# Patient Record
Sex: FEMALE | Race: WHITE | NOT HISPANIC OR LATINO | URBAN - METROPOLITAN AREA
[De-identification: names, ages, dates, MRNs, and addresses within clinical notes are randomized per-mention and may not be internally consistent; named-entity substitution may affect disease eponyms.]

---

## 2025-04-18 ENCOUNTER — APPOINTMENT (OUTPATIENT)
Dept: PRIMARY CARE | Facility: CLINIC | Age: 30
End: 2025-04-18
Payer: COMMERCIAL

## 2025-04-28 ENCOUNTER — APPOINTMENT (OUTPATIENT)
Dept: PRIMARY CARE | Facility: CLINIC | Age: 30
End: 2025-04-28
Payer: COMMERCIAL

## 2025-04-28 VITALS
BODY MASS INDEX: 25.33 KG/M2 | HEART RATE: 65 BPM | WEIGHT: 152 LBS | OXYGEN SATURATION: 99 % | HEIGHT: 65 IN | DIASTOLIC BLOOD PRESSURE: 71 MMHG | SYSTOLIC BLOOD PRESSURE: 127 MMHG

## 2025-04-28 DIAGNOSIS — R68.89 COLD FEELING: ICD-10-CM

## 2025-04-28 DIAGNOSIS — Z13.220 SCREENING FOR HYPERLIPIDEMIA: ICD-10-CM

## 2025-04-28 DIAGNOSIS — Z00.00 WELLNESS EXAMINATION: Primary | ICD-10-CM

## 2025-04-28 DIAGNOSIS — Z13.21 ENCOUNTER FOR VITAMIN DEFICIENCY SCREENING: ICD-10-CM

## 2025-04-28 DIAGNOSIS — Z13.1 SCREENING FOR DIABETES MELLITUS: ICD-10-CM

## 2025-04-28 PROCEDURE — 1036F TOBACCO NON-USER: CPT

## 2025-04-28 PROCEDURE — 3008F BODY MASS INDEX DOCD: CPT

## 2025-04-28 PROCEDURE — 90715 TDAP VACCINE 7 YRS/> IM: CPT

## 2025-04-28 PROCEDURE — 99385 PREV VISIT NEW AGE 18-39: CPT

## 2025-04-28 PROCEDURE — 90471 IMMUNIZATION ADMIN: CPT

## 2025-04-28 ASSESSMENT — PATIENT HEALTH QUESTIONNAIRE - PHQ9
1. LITTLE INTEREST OR PLEASURE IN DOING THINGS: NOT AT ALL
2. FEELING DOWN, DEPRESSED OR HOPELESS: NOT AT ALL
SUM OF ALL RESPONSES TO PHQ9 QUESTIONS 1 AND 2: 0

## 2025-04-28 NOTE — PROGRESS NOTES
"  Subjective   Patient ID: Parvin Alaniz is a 29 y.o. female who presents for Annual Exam (Pt states she is here to establish care and for annual exam. Pt states she does see an OBGYN. Last pap 2023. No other concerns at the moment.).    Patient presents to establish care and have yearly physical.  Patient states that she has not had a physical in probably at least 10 years.  Patient denies any significant past medical history.  Patient states that she does have occasional constipation, and does take Metamucil which usually resolves her symptoms.  She also endorses being cold generally only at night.  Patient states that this is not an every night thing, but was concerned she could possibly be anemic.        Past Medical, Surgical, Social and Family Hx reviewed-Yes        Last pap: 2023  Last Mammogram: N/A  Colon CA screening Hx: N/A  Labs: CMP, Vit D, Lipid, A1C, CBC, Iron/Ferritin  Up to date on immunizations: Tdap given today  Dentist in the past year: Yes     Menstruation: Regular, no birth control   Sexual Activity: No concerns         PE:  /71   Pulse 65   Ht 1.651 m (5' 5\")   Wt 68.9 kg (152 lb)   LMP 04/09/2025   SpO2 99%   BMI 25.29 kg/m²   Alert adult woman, NAD  HEENT clear  Neck supple, No LAD  Heart RRR no murmur  Lungs CTA bilat  Abdomen benign, normal BS, soft NT/ND  Skin warm, dry, clear  Neuro grossly normal, gait WNL  Affect pleasant and appropriate, memory and judgement WNL    Discussed with patient that we will order some baseline labs for her yearly physical, and also rule out anemia.  For her constipation recommended taking MiraLAX 1 capful in 8 ounces of water daily.  Tdap was administered here in office today.  Follow-up in 1 year for yearly physical.    Assessment/Plan   Problem List Items Addressed This Visit           ICD-10-CM    Wellness examination - Primary Z00.00    Relevant Orders    Comprehensive metabolic panel    Cold feeling R68.89    Relevant Orders    " TSH with reflex to Free T4 if abnormal    CBC and Auto Differential    Iron and TIBC     Other Visit Diagnoses         Codes      Screening for hyperlipidemia     Z13.220    Relevant Orders    Lipid Panel      Screening for diabetes mellitus     Z13.1    Relevant Orders    Hemoglobin A1c      Encounter for vitamin deficiency screening     Z13.21    Relevant Orders    Vitamin D 25-Hydroxy,Total (for eval of Vitamin D levels)

## 2025-06-15 LAB
25(OH)D3+25(OH)D2 SERPL-MCNC: 31 NG/ML (ref 30–100)
ALBUMIN SERPL-MCNC: 4.6 G/DL (ref 3.6–5.1)
ALP SERPL-CCNC: 54 U/L (ref 31–125)
ALT SERPL-CCNC: 11 U/L (ref 6–29)
ANION GAP SERPL CALCULATED.4IONS-SCNC: 7 MMOL/L (CALC) (ref 7–17)
AST SERPL-CCNC: 14 U/L (ref 10–30)
BASOPHILS # BLD AUTO: 30 CELLS/UL (ref 0–200)
BASOPHILS NFR BLD AUTO: 0.7 %
BILIRUB SERPL-MCNC: 0.7 MG/DL (ref 0.2–1.2)
BUN SERPL-MCNC: 16 MG/DL (ref 7–25)
CALCIUM SERPL-MCNC: 9.6 MG/DL (ref 8.6–10.2)
CHLORIDE SERPL-SCNC: 107 MMOL/L (ref 98–110)
CHOLEST SERPL-MCNC: 179 MG/DL
CHOLEST/HDLC SERPL: 2.2 (CALC)
CO2 SERPL-SCNC: 26 MMOL/L (ref 20–32)
CREAT SERPL-MCNC: 0.92 MG/DL (ref 0.5–0.97)
EGFRCR SERPLBLD CKD-EPI 2021: 85 ML/MIN/1.73M2
EOSINOPHIL # BLD AUTO: 30 CELLS/UL (ref 15–500)
EOSINOPHIL NFR BLD AUTO: 0.7 %
ERYTHROCYTE [DISTWIDTH] IN BLOOD BY AUTOMATED COUNT: 12.1 % (ref 11–15)
EST. AVERAGE GLUCOSE BLD GHB EST-MCNC: 97 MG/DL
EST. AVERAGE GLUCOSE BLD GHB EST-SCNC: 5.4 MMOL/L
GLUCOSE SERPL-MCNC: 89 MG/DL (ref 65–99)
HBA1C MFR BLD: 5 %
HCT VFR BLD AUTO: 39.4 % (ref 35–45)
HDLC SERPL-MCNC: 81 MG/DL
HGB BLD-MCNC: 12.8 G/DL (ref 11.7–15.5)
IRON SATN MFR SERPL: 23 % (CALC) (ref 16–45)
IRON SERPL-MCNC: 83 MCG/DL (ref 40–190)
LDLC SERPL CALC-MCNC: 86 MG/DL (CALC)
LYMPHOCYTES # BLD AUTO: 1737 CELLS/UL (ref 850–3900)
LYMPHOCYTES NFR BLD AUTO: 40.4 %
MCH RBC QN AUTO: 29.6 PG (ref 27–33)
MCHC RBC AUTO-ENTMCNC: 32.5 G/DL (ref 32–36)
MCV RBC AUTO: 91 FL (ref 80–100)
MONOCYTES # BLD AUTO: 331 CELLS/UL (ref 200–950)
MONOCYTES NFR BLD AUTO: 7.7 %
NEUTROPHILS # BLD AUTO: 2172 CELLS/UL (ref 1500–7800)
NEUTROPHILS NFR BLD AUTO: 50.5 %
NONHDLC SERPL-MCNC: 98 MG/DL (CALC)
PLATELET # BLD AUTO: 191 THOUSAND/UL (ref 140–400)
PMV BLD REES-ECKER: 12 FL (ref 7.5–12.5)
POTASSIUM SERPL-SCNC: 4.3 MMOL/L (ref 3.5–5.3)
PROT SERPL-MCNC: 6.6 G/DL (ref 6.1–8.1)
RBC # BLD AUTO: 4.33 MILLION/UL (ref 3.8–5.1)
SODIUM SERPL-SCNC: 140 MMOL/L (ref 135–146)
TIBC SERPL-MCNC: 360 MCG/DL (CALC) (ref 250–450)
TRIGL SERPL-MCNC: 41 MG/DL
TSH SERPL-ACNC: 1.76 MIU/L
WBC # BLD AUTO: 4.3 THOUSAND/UL (ref 3.8–10.8)